# Patient Record
Sex: FEMALE | Race: ASIAN | URBAN - METROPOLITAN AREA
[De-identification: names, ages, dates, MRNs, and addresses within clinical notes are randomized per-mention and may not be internally consistent; named-entity substitution may affect disease eponyms.]

---

## 2020-09-21 ENCOUNTER — OFFICE VISIT (OUTPATIENT)
Dept: URBAN - METROPOLITAN AREA CLINIC 115 | Facility: CLINIC | Age: 55
End: 2020-09-21
Payer: COMMERCIAL

## 2020-09-21 ENCOUNTER — LAB OUTSIDE AN ENCOUNTER (OUTPATIENT)
Dept: URBAN - METROPOLITAN AREA CLINIC 115 | Facility: CLINIC | Age: 55
End: 2020-09-21

## 2020-09-21 DIAGNOSIS — Z12.11 COLON CANCER SCREENING: ICD-10-CM

## 2020-09-21 DIAGNOSIS — R10.13 DYSPEPSIA: ICD-10-CM

## 2020-09-21 PROCEDURE — 3017F COLORECTAL CA SCREEN DOC REV: CPT | Performed by: INTERNAL MEDICINE

## 2020-09-21 PROCEDURE — G8427 DOCREV CUR MEDS BY ELIG CLIN: HCPCS | Performed by: INTERNAL MEDICINE

## 2020-09-21 PROCEDURE — G8420 CALC BMI NORM PARAMETERS: HCPCS | Performed by: INTERNAL MEDICINE

## 2020-09-21 PROCEDURE — 1036F TOBACCO NON-USER: CPT | Performed by: INTERNAL MEDICINE

## 2020-09-21 PROCEDURE — 99203 OFFICE O/P NEW LOW 30 MIN: CPT | Performed by: INTERNAL MEDICINE

## 2020-09-21 NOTE — HPI-TODAY'S VISIT:
Ms. Jordan is a very pleasant 55-year-old  female who comes to schedule screening colonoscopy.  She has never had colonoscopy.  There is no family history of colorectal cancer or polyps.  She has no bowel related complaints. She does have dyspepsia.  It is a feeling of hunger that is relieved by excess eating.  She does pass excess gas.  She had an upper endoscopy many many years ago that was negative for similar complaint.  It went away for a while and then returned.

## 2020-09-28 ENCOUNTER — TELEPHONE ENCOUNTER (OUTPATIENT)
Dept: URBAN - METROPOLITAN AREA CLINIC 42 | Facility: CLINIC | Age: 55
End: 2020-09-28

## 2020-10-21 ENCOUNTER — TELEPHONE ENCOUNTER (OUTPATIENT)
Dept: URBAN - METROPOLITAN AREA CLINIC 92 | Facility: CLINIC | Age: 55
End: 2020-10-21

## 2020-10-27 ENCOUNTER — OFFICE VISIT (OUTPATIENT)
Dept: URBAN - METROPOLITAN AREA SURGERY CENTER 13 | Facility: SURGERY CENTER | Age: 55
End: 2020-10-27

## 2020-11-17 ENCOUNTER — OFFICE VISIT (OUTPATIENT)
Dept: URBAN - METROPOLITAN AREA SURGERY CENTER 13 | Facility: SURGERY CENTER | Age: 55
End: 2020-11-17

## 2021-08-30 ENCOUNTER — LAB OUTSIDE AN ENCOUNTER (OUTPATIENT)
Dept: URBAN - METROPOLITAN AREA CLINIC 115 | Facility: CLINIC | Age: 56
End: 2021-08-30

## 2021-08-30 ENCOUNTER — WEB ENCOUNTER (OUTPATIENT)
Dept: URBAN - METROPOLITAN AREA CLINIC 115 | Facility: CLINIC | Age: 56
End: 2021-08-30

## 2021-08-30 ENCOUNTER — DASHBOARD ENCOUNTERS (OUTPATIENT)
Age: 56
End: 2021-08-30

## 2021-08-30 ENCOUNTER — OFFICE VISIT (OUTPATIENT)
Dept: URBAN - METROPOLITAN AREA CLINIC 115 | Facility: CLINIC | Age: 56
End: 2021-08-30
Payer: COMMERCIAL

## 2021-08-30 DIAGNOSIS — R10.13 DYSPEPSIA: ICD-10-CM

## 2021-08-30 DIAGNOSIS — Z12.11 COLON CANCER SCREENING: ICD-10-CM

## 2021-08-30 DIAGNOSIS — R14.0 ABDOMINAL BLOATING: ICD-10-CM

## 2021-08-30 PROCEDURE — 99213 OFFICE O/P EST LOW 20 MIN: CPT | Performed by: INTERNAL MEDICINE

## 2021-08-30 NOTE — HPI-TODAY'S VISIT:
Ms. Jordan is a very pleasant 56-year-old  female who comes to schedule screening colonoscopy.  She has never had colonoscopy.  There is no family history of colorectal cancer.  She has no bowel related complaints.  Has alot of gas and bloating.  Worse with eating salads or fibers.  She does have dyspepsia and heartburn.  Taking lansopraozle every other day and that controls.  Has been on it for 10 days.  She had an upper endoscopy in 2006, it was negative.  ago that was negative for similar complaint.  It went away for a while and then returned.

## 2021-08-30 NOTE — PHYSICAL EXAM HENT:
Head,  normocephalic,  atraumatic,  Face,  Face within normal limits,  Ears,  External ears within normal limits, Wearing facemask

## 2021-10-19 ENCOUNTER — CLAIMS CREATED FROM THE CLAIM WINDOW (OUTPATIENT)
Dept: URBAN - METROPOLITAN AREA CLINIC 4 | Facility: CLINIC | Age: 56
End: 2021-10-19
Payer: COMMERCIAL

## 2021-10-19 ENCOUNTER — OFFICE VISIT (OUTPATIENT)
Dept: URBAN - METROPOLITAN AREA SURGERY CENTER 13 | Facility: SURGERY CENTER | Age: 56
End: 2021-10-19
Payer: COMMERCIAL

## 2021-10-19 DIAGNOSIS — Z12.11 AVERAGE RISK FOR CRC. DUE TO PT'S CO-MORBID STATE WITH END STAGE DEMENTIA, HIGH RISK FOR ANESTHESIA (PER NEUROLOGY); INABILITY TO TAKE A BOWEL PREP....WOULD NOT ADVISE ANY COLORECTAL CANCER SCREENING INCLUDING STOOL TEST FOR FECAL BLOOD.: ICD-10-CM

## 2021-10-19 DIAGNOSIS — K31.89 ACQUIRED DEFORMITY OF DUODENUM: ICD-10-CM

## 2021-10-19 DIAGNOSIS — K31.89 DEFORMED PYLORUS, ACQUIRED: ICD-10-CM

## 2021-10-19 DIAGNOSIS — K31.7 POLYP OF STOMACH AND DUODENUM: ICD-10-CM

## 2021-10-19 DIAGNOSIS — D12.2 BENIGN NEOPLASM OF ASCENDING COLON: ICD-10-CM

## 2021-10-19 DIAGNOSIS — R10.13 ABDOMINAL DISCOMFORT, EPIGASTRIC: ICD-10-CM

## 2021-10-19 DIAGNOSIS — D12.2 ADENOMA OF ASCENDING COLON: ICD-10-CM

## 2021-10-19 DIAGNOSIS — K31.7 BENIGN GASTRIC POLYP: ICD-10-CM

## 2021-10-19 PROCEDURE — 88312 SPECIAL STAINS GROUP 1: CPT | Performed by: PATHOLOGY

## 2021-10-19 PROCEDURE — G8907 PT DOC NO EVENTS ON DISCHARG: HCPCS | Performed by: INTERNAL MEDICINE

## 2021-10-19 PROCEDURE — 45385 COLONOSCOPY W/LESION REMOVAL: CPT | Performed by: INTERNAL MEDICINE

## 2021-10-19 PROCEDURE — 88305 TISSUE EXAM BY PATHOLOGIST: CPT | Performed by: PATHOLOGY

## 2021-10-19 PROCEDURE — 43239 EGD BIOPSY SINGLE/MULTIPLE: CPT | Performed by: INTERNAL MEDICINE
